# Patient Record
Sex: FEMALE | Race: WHITE
[De-identification: names, ages, dates, MRNs, and addresses within clinical notes are randomized per-mention and may not be internally consistent; named-entity substitution may affect disease eponyms.]

---

## 2018-04-03 ENCOUNTER — HOSPITAL ENCOUNTER (OUTPATIENT)
Dept: HOSPITAL 62 - SC | Age: 63
Discharge: HOME | End: 2018-04-03
Attending: OPHTHALMOLOGY
Payer: COMMERCIAL

## 2018-04-03 DIAGNOSIS — H04.123: ICD-10-CM

## 2018-04-03 DIAGNOSIS — H01.004: ICD-10-CM

## 2018-04-03 DIAGNOSIS — Z79.899: ICD-10-CM

## 2018-04-03 DIAGNOSIS — I10: ICD-10-CM

## 2018-04-03 DIAGNOSIS — H25.13: Primary | ICD-10-CM

## 2018-04-03 DIAGNOSIS — H01.001: ICD-10-CM

## 2018-04-03 DIAGNOSIS — F17.210: ICD-10-CM

## 2018-04-03 DIAGNOSIS — H52.201: ICD-10-CM

## 2018-04-03 DIAGNOSIS — L30.8: ICD-10-CM

## 2018-04-03 PROCEDURE — 08RJ3JZ REPLACEMENT OF RIGHT LENS WITH SYNTHETIC SUBSTITUTE, PERCUTANEOUS APPROACH: ICD-10-PCS | Performed by: OPHTHALMOLOGY

## 2018-04-03 PROCEDURE — V2787 ASTIGMATISM-CORRECT FUNCTION: HCPCS

## 2018-04-03 PROCEDURE — 66984 XCAPSL CTRC RMVL W/O ECP: CPT

## 2018-04-03 RX ADMIN — TROPICAMIDE PRN DROP: 10 SOLUTION/ DROPS OPHTHALMIC at 10:08

## 2018-04-03 RX ADMIN — BESIFLOXACIN PRN DROP: 6 SUSPENSION OPHTHALMIC at 11:13

## 2018-04-03 RX ADMIN — BESIFLOXACIN PRN DROP: 6 SUSPENSION OPHTHALMIC at 10:13

## 2018-04-03 RX ADMIN — TROPICAMIDE PRN DROP: 10 SOLUTION/ DROPS OPHTHALMIC at 10:31

## 2018-04-03 RX ADMIN — CYCLOPENTOLATE HYDROCHLORIDE AND PHENYLEPHRINE HYDROCHLORIDE PRN DROP: 2; 10 SOLUTION/ DROPS OPHTHALMIC at 10:19

## 2018-04-03 RX ADMIN — CYCLOPENTOLATE HYDROCHLORIDE AND PHENYLEPHRINE HYDROCHLORIDE PRN DROP: 2; 10 SOLUTION/ DROPS OPHTHALMIC at 10:31

## 2018-04-03 RX ADMIN — TETRACAINE HYDROCHLORIDE PRN DROP: 25 LIQUID OPHTHALMIC at 10:37

## 2018-04-03 RX ADMIN — CYCLOPENTOLATE HYDROCHLORIDE AND PHENYLEPHRINE HYDROCHLORIDE PRN DROP: 2; 10 SOLUTION/ DROPS OPHTHALMIC at 10:08

## 2018-04-03 RX ADMIN — TETRACAINE HYDROCHLORIDE PRN DROP: 25 LIQUID OPHTHALMIC at 10:10

## 2018-04-03 RX ADMIN — BESIFLOXACIN PRN DROP: 6 SUSPENSION OPHTHALMIC at 10:09

## 2018-04-03 RX ADMIN — BESIFLOXACIN PRN DROP: 6 SUSPENSION OPHTHALMIC at 10:32

## 2018-04-03 RX ADMIN — TROPICAMIDE PRN DROP: 10 SOLUTION/ DROPS OPHTHALMIC at 10:19

## 2018-04-03 NOTE — [facility] DISCHARGE SUMMARY E
[facility] Discharge [facility]



NAME: DANIEL MORRISON

                                      MRN: [id number]

                                      AGE: 62Y

ADMITTED: 04/03/2018           DISCHARGED: 04/03/2018



FINAL DIAGNOSES:

1.  Cataract, right eye.

2.  Stigmatism, right eye.



HOSPITAL COURSE:

The patient is a 62-year-old lady who underwent uneventful cataract

extraction with toric intraocular lens implant, right eye, on 04/03/2018. 

She will be discharged to home.  She was instructed to resume preoperative

medications, to take Tylenol as needed for discomfort, to keep her eye

shielded, to use Besivance, Durezol and Ilevro at 3 p.m. and 8 p.m., and to

follow up in my office in 1 day.





DICTATING PHYSICIAN: SAMMY CORBIN M.D.



1209M              DT: 04/03/2018 1130

PHY#: [id number]        DD: 04/03/2018 1121

ID:   [id number]               JOB#: [id number]       ACCT: [id number]



cc:SAMMY CORBIN M.D.

>

## 2018-04-03 NOTE — [facility] OPERATIVE REPORT E
[facility] Operative Report



NAME: DANIEL MORRISON

                                      MRN: [id number]

                                      AGE: 62Y

DATE OF SURGERY: 04/03/2018          ROOM:



PREOPERATIVE DIAGNOSES:

1.  Cataract, right eye.

2.  Stigmatism, right eye.



POSTOPERATIVE DIAGNOSES:

1.  Cataract, right eye.

2.  Stigmatism, right eye.



PROCEDURE PERFORMED:

Phacoemulsification with toric intraocular lens implant, right eye.



SURGEON:

SAMMY CORBIN M.D.



ANESTHESIA:

Topical with MAC.



INDICATIONS FOR SURGERY:

Difficulty with night driving.  Best corrected visual acuity 20/40.



PROCEDURE:

The patient was brought to the Operating Room and placed on the operative

table.  Following tetracaine drops, topical anesthesia was administered. 

This consisted of instrument wipe pledgets soaked in a solution of 4%

Xylocaine mixed with 0.75% Marcaine in a 1:2 ratio.  A 2 x 1 cm pledget was

placed in the superior fornix.  A 1 x 1 cm pledget was placed in the

inferior fornix.  The eye was patched shut for 5 minutes. The patch was

removed.  The eye was sterilely prepped and draped in the usual manner. 

Lid speculum was placed in the eye.  The pledgets were removed and 4-0

black silk sutures were placed around the superior and the inferior rectus

muscles to be used as traction.  A conjunctival peritomy was made at the 10

o'clock position.  Hemostasis was obtained with bipolar cautery.  A

posterior limbal groove was created using a crescent knife and dissected

anteriorly towards the cornea.  A sharp point blade was used to create a

paracentesis site at the 2 o'clock position.  A 2.4 mm keratome was used to

enter the anterior chamber through the groove.  Viscoelastic was injected

into the anterior chamber.  An anterior capsulotomy was performed using

Utrata forceps in a capsulorrhexis fashion.  Hydrodissection and

hydrodelineation were performed.  Phacoemulsification was performed in

divide-and-conquer technique.  A total of 8.20 CDE phaco time was used.



Following this, the I/A unit was used to remove residual cortex.

Viscoelastic was injected into the capsular bag.  Intraocular lens model

[id number], 20.5 diopters, serial number [id number], was placed in the

capsular bag.  The I/A unit was used to remove residual viscoelastic.  The

wound was seen to be watertight under high and low pressure, and no sutures

were placed.  The intraocular lens was well centered.  The pressure was

adjusted in the eye to normal pressure.  The 4-0 black silk sutures and lid

speculum were removed.  The eye was shielded after Besivance drops were

placed.  The patient tolerated the procedure well and was sent to the

Recovery Room in good condition.



Prior to the surgery the patient was placed in a seated position and the 0,

270 and 180-degree axis of the eye was marked using a marking level.  Prior

to inserting the lens the 1-degree axis was marked on the eye and the lens

was centered at this axis.





DICTATING PHYSICIAN: SAMMY CORBIN M.D.



1209M              DT: 04/03/2018 1127

PHY#: [id number]        DD: 04/03/2018 1121

ID:   [id number]               JOB#: [id number]       ACCT: [id number]



cc:SAMMY CORBIN M.D.

>

## 2018-05-01 ENCOUNTER — HOSPITAL ENCOUNTER (OUTPATIENT)
Dept: HOSPITAL 62 - SC | Age: 63
Discharge: HOME | End: 2018-05-01
Attending: OPHTHALMOLOGY
Payer: COMMERCIAL

## 2018-05-01 DIAGNOSIS — H53.15: ICD-10-CM

## 2018-05-01 DIAGNOSIS — H25.12: Primary | ICD-10-CM

## 2018-05-01 DIAGNOSIS — Z96.1: ICD-10-CM

## 2018-05-01 DIAGNOSIS — E66.9: ICD-10-CM

## 2018-05-01 DIAGNOSIS — I10: ICD-10-CM

## 2018-05-01 PROCEDURE — V2787 ASTIGMATISM-CORRECT FUNCTION: HCPCS

## 2018-05-01 PROCEDURE — 66984 XCAPSL CTRC RMVL W/O ECP: CPT

## 2018-05-01 RX ADMIN — BESIFLOXACIN PRN DROP: 6 SUSPENSION OPHTHALMIC at 11:00

## 2018-05-01 RX ADMIN — BESIFLOXACIN PRN DROP: 6 SUSPENSION OPHTHALMIC at 10:50

## 2018-05-01 RX ADMIN — CYCLOPENTOLATE HYDROCHLORIDE AND PHENYLEPHRINE HYDROCHLORIDE PRN DROP: 2; 10 SOLUTION/ DROPS OPHTHALMIC at 11:00

## 2018-05-01 RX ADMIN — TETRACAINE HYDROCHLORIDE PRN DROP: 25 LIQUID OPHTHALMIC at 11:12

## 2018-05-01 RX ADMIN — BESIFLOXACIN PRN DROP: 6 SUSPENSION OPHTHALMIC at 00:00

## 2018-05-01 RX ADMIN — CYCLOPENTOLATE HYDROCHLORIDE AND PHENYLEPHRINE HYDROCHLORIDE PRN DROP: 2; 10 SOLUTION/ DROPS OPHTHALMIC at 11:11

## 2018-05-01 RX ADMIN — TROPICAMIDE PRN DROP: 10 SOLUTION/ DROPS OPHTHALMIC at 11:00

## 2018-05-01 RX ADMIN — CYCLOPENTOLATE HYDROCHLORIDE AND PHENYLEPHRINE HYDROCHLORIDE PRN DROP: 2; 10 SOLUTION/ DROPS OPHTHALMIC at 10:49

## 2018-05-01 RX ADMIN — BESIFLOXACIN PRN DROP: 6 SUSPENSION OPHTHALMIC at 11:46

## 2018-05-01 RX ADMIN — TROPICAMIDE PRN DROP: 10 SOLUTION/ DROPS OPHTHALMIC at 10:49

## 2018-05-01 RX ADMIN — TETRACAINE HYDROCHLORIDE PRN DROP: 25 LIQUID OPHTHALMIC at 10:51

## 2018-05-01 RX ADMIN — TROPICAMIDE PRN DROP: 10 SOLUTION/ DROPS OPHTHALMIC at 11:11

## 2018-05-01 NOTE — [facility] DISCHARGE [facility] Discharge Summary



NAME: DANIEL MORRISON

                                      MRN: [id number]

                                AGE: 62Y

ADMITTED: 05/01/2018           DISCHARGED: 05/01/2018



FINAL DIAGNOSIS:

Cataract, left eye.



HOSPITAL COURSE:

The patient is a 62-year-old lady who underwent uneventful cataract

extraction with Torque intraocular lens implant left eye on 05/01/2018. 

She will be discharged to home.  She is instructed to resume preoperative

medications, take Tylenol as needed for discomfort, to keep her eye

shielded, to use Besivance, Durezol and Ilevro at 3 p.m. and 8 p.m., and to

follow up in my office in one day.







DICTATING PHYSICIAN: SAMMY CORBIN M.D.



5163M              DT: 05/01/2018 1207

PHY#: [id number]        DD: 05/01/2018 1152

ID:   [id number]               JOB#: [id number]       ACCT: [id number]



cc:SAMMY CORBIN M.D.

>

## 2018-05-01 NOTE — [facility]PERAT[facility] REPORT E
[facility] Operative Report



NAME: DANIEL MORRISON

                                      MRN: [id number]

                             AGE: 62Y

DATE OF SURGERY: 05/01/2018                   ROOM:



PREOPERATIVE DIAGNOSIS:

Cataract, left eye.



POSTOPERATIVE DIAGNOSIS:

Cataract, left eye.



OPERATION:

Phacoemulsification with Torque intraocular lens implant, left eye.



SURGEON:

SAMMY CORBIN M.D.



ANESTHESIA:

Topical with MAC.



INDICATIONS FOR SURGERY:

Difficulty driving especially at night.  Best corrected visual acuity 20/70

with glare.



PROCEDURE:

The patient was brought to the Operating Room and placed on the operative

table. Following tetracaine drops, topical anesthesia was administered.

This consisted of instrument wipe pledgets soaked in a solution of 4%

Xylocaine mixed with 0.75% Marcaine in a 1:2 ratio. A 2 x 1 cm pledget was

placed in the superior fornix. A 1 x 1 cm pledget was placed in the

inferior fornix. The eye was patched shut for 5 minutes. The patch was

removed. The eye was sterilely prepped and draped in the usual manner. Lid

speculum was placed in the eye. The pledgets were removed. 4-0 black silk

sutures were placed around the superior and the inferior rectus muscles to

be used as traction. A conjunctival peritomy was made at the 10 o'clock

position. Hemostasis was obtained with bipolar cautery. A posterior limbal

groove was created using a crescent knife and dissected anteriorly towards

the cornea. A sharp point blade was used to create a paracentesis site at

the 2 o'clock position. A 2.4 mm keratome was used to enter the anterior

chamber through the groove. Viscoelastic was injected into the anterior

chamber. An anterior capsulotomy was performed using Utrata forceps in a

capsulorrhexis fashion. Hydrodissection and hydrodelineation were

performed. Phacoemulsification was performed in divide-and-conquer

technique. A total of 49 seconds phaco time was used.

Following this, the I/A unit was used to remove residual cortex.

Viscoelastic was injected into the capsular bag. Intraocular lens model

[id number], 22.0 diopters, serial number [id number] was placed in the

capsular bag. The I/A unit was used to remove residual viscoelastic. The

wound was seen to be watertight under high and low pressure, and no sutures

were placed. The intraocular lens was well centered. The pressure was

adjusted in the eye to normal pressure. The 4-0 black silk sutures and lid

speculum were removed. The eye was shielded after Besivance drops were

placed. The patient tolerated the procedure well and was sent to the

Recovery Room in good condition.

Prior to the surgery, the patient was placed in a seated position and the

0.270, 180-degree axis of the eye was marked using a marking level.  Prior

to inserting the lens, the 180-degree axis was marked on the eye and the

lens was centered at this axis.

















DICTATING PHYSICIAN: SAMMY CORBIN M.D.



5163M              DT: 05/01/2018 1202

PHY#: [id number]        DD: 05/01/2018 1152

ID:   [id number]               JOB#: [id number]       ACCT: [id number]



cc:SAMMY CORBIN M.D.

>